# Patient Record
Sex: FEMALE | Race: WHITE | NOT HISPANIC OR LATINO | URBAN - METROPOLITAN AREA
[De-identification: names, ages, dates, MRNs, and addresses within clinical notes are randomized per-mention and may not be internally consistent; named-entity substitution may affect disease eponyms.]

---

## 2017-01-19 ENCOUNTER — COMMUNICATION - HEALTHEAST (OUTPATIENT)
Dept: HEALTH INFORMATION MANAGEMENT | Facility: CLINIC | Age: 32
End: 2017-01-19

## 2018-06-20 ENCOUNTER — HOSPITAL ENCOUNTER (OUTPATIENT)
Dept: OBGYN | Facility: CLINIC | Age: 33
Discharge: HOME | End: 2018-06-20
Attending: OBSTETRICS & GYNECOLOGY | Admitting: OBSTETRICS & GYNECOLOGY

## 2018-06-20 ASSESSMENT — MIFFLIN-ST. JEOR: SCORE: 1639.36

## 2018-06-22 ENCOUNTER — HOSPITAL ENCOUNTER (OUTPATIENT)
Dept: MEDSURG UNIT | Facility: CLINIC | Age: 33
Discharge: HOME | End: 2018-06-22
Attending: OBSTETRICS & GYNECOLOGY | Admitting: OBSTETRICS & GYNECOLOGY

## 2018-06-22 LAB — FETAL RBC % LFV: 0 %

## 2018-06-22 ASSESSMENT — MIFFLIN-ST. JEOR: SCORE: 1639.36

## 2018-08-07 ENCOUNTER — HOSPITAL ENCOUNTER (OUTPATIENT)
Dept: OBGYN | Facility: CLINIC | Age: 33
Discharge: HOME | End: 2018-08-07
Attending: ADVANCED PRACTICE MIDWIFE | Admitting: OBSTETRICS & GYNECOLOGY

## 2018-08-07 LAB
ABO/RH(D): NORMAL
ALT SERPL W P-5'-P-CCNC: 19 U/L (ref 0–45)
ANTIBODY SCREEN: NEGATIVE
APTT PPP: 26 SECONDS (ref 24–37)
AST SERPL W P-5'-P-CCNC: 18 U/L (ref 0–40)
CREAT SERPL-MCNC: 0.58 MG/DL (ref 0.6–1.1)
CREAT UR-MCNC: 35.5 MG/DL
ERYTHROCYTE [DISTWIDTH] IN BLOOD BY AUTOMATED COUNT: 13.7 % (ref 11–14.5)
GFR SERPL CREATININE-BSD FRML MDRD: >60 ML/MIN/1.73M2
HCT VFR BLD AUTO: 39.6 % (ref 35–47)
HGB BLD-MCNC: 13.8 G/DL (ref 12–16)
INR PPP: 1.04 (ref 0.9–1.1)
MCH RBC QN AUTO: 31.9 PG (ref 27–34)
MCHC RBC AUTO-ENTMCNC: 34.8 G/DL (ref 32–36)
MCV RBC AUTO: 92 FL (ref 80–100)
PLATELET # BLD AUTO: 181 THOU/UL (ref 140–440)
PMV BLD AUTO: 9.9 FL (ref 8.5–12.5)
PROTEIN, RANDOM URINE - HISTORICAL: <7 MG/DL
PROTEIN/CREAT RATIO, RANDOM UR: NORMAL
RBC # BLD AUTO: 4.33 MILL/UL (ref 3.8–5.4)
URATE SERPL-MCNC: 4.9 MG/DL (ref 2–7.5)
WBC: 10.1 THOU/UL (ref 4–11)

## 2018-08-07 ASSESSMENT — MIFFLIN-ST. JEOR: SCORE: 1662.04

## 2018-08-08 ENCOUNTER — HOSPITAL ENCOUNTER (OUTPATIENT)
Dept: MEDSURG UNIT | Facility: CLINIC | Age: 33
Discharge: HOME | End: 2018-08-08
Attending: ADVANCED PRACTICE MIDWIFE | Admitting: OBSTETRICS & GYNECOLOGY

## 2018-08-08 LAB
ALBUMIN UR-MCNC: NEGATIVE MG/DL
APPEARANCE UR: CLEAR
BILIRUB UR QL STRIP: NEGATIVE
COLOR UR AUTO: ABNORMAL
CREAT UR-MCNC: 47.8 MG/DL
GLUCOSE BLDC GLUCOMTR-MCNC: 81 MG/DL
GLUCOSE UR STRIP-MCNC: ABNORMAL MG/DL
HGB UR QL STRIP: NEGATIVE
KETONES UR STRIP-MCNC: NEGATIVE MG/DL
LEUKOCYTE ESTERASE UR QL STRIP: NEGATIVE
NITRATE UR QL: NEGATIVE
PH UR STRIP: 7 [PH] (ref 4.5–8)
PROTEIN, RANDOM URINE - HISTORICAL: <7 MG/DL
PROTEIN/CREAT RATIO, RANDOM UR: NORMAL
SP GR UR STRIP: 1.01 (ref 1–1.03)
UROBILINOGEN UR STRIP-ACNC: ABNORMAL

## 2018-08-13 ENCOUNTER — HOSPITAL ENCOUNTER (OUTPATIENT)
Dept: MEDSURG UNIT | Facility: CLINIC | Age: 33
Discharge: HOME | End: 2018-08-13
Attending: OBSTETRICS & GYNECOLOGY | Admitting: OBSTETRICS & GYNECOLOGY

## 2018-08-13 LAB
ABO/RH(D): NORMAL
ALT SERPL W P-5'-P-CCNC: 18 U/L (ref 0–45)
ANTIBODY SCREEN: NEGATIVE
APTT PPP: 26 SECONDS (ref 24–37)
AST SERPL W P-5'-P-CCNC: 19 U/L (ref 0–40)
CREAT SERPL-MCNC: 0.59 MG/DL (ref 0.6–1.1)
CREAT UR-MCNC: 120.2 MG/DL
ERYTHROCYTE [DISTWIDTH] IN BLOOD BY AUTOMATED COUNT: 13.9 % (ref 11–14.5)
GFR SERPL CREATININE-BSD FRML MDRD: >60 ML/MIN/1.73M2
HCT VFR BLD AUTO: 39 % (ref 35–47)
HGB BLD-MCNC: 13.8 G/DL (ref 12–16)
INR PPP: 1.07 (ref 0.9–1.1)
MCH RBC QN AUTO: 32.5 PG (ref 27–34)
MCHC RBC AUTO-ENTMCNC: 35.4 G/DL (ref 32–36)
MCV RBC AUTO: 92 FL (ref 80–100)
PLATELET # BLD AUTO: 166 THOU/UL (ref 140–440)
PMV BLD AUTO: 9.9 FL (ref 8.5–12.5)
PROTEIN, RANDOM URINE - HISTORICAL: 12 MG/DL
PROTEIN/CREAT RATIO, RANDOM UR: 0.1
RBC # BLD AUTO: 4.25 MILL/UL (ref 3.8–5.4)
URATE SERPL-MCNC: 5.1 MG/DL (ref 2–7.5)
WBC: 10.6 THOU/UL (ref 4–11)

## 2018-08-13 ASSESSMENT — MIFFLIN-ST. JEOR: SCORE: 1662.04

## 2018-08-20 ENCOUNTER — RECORDS - HEALTHEAST (OUTPATIENT)
Dept: ADMINISTRATIVE | Facility: OTHER | Age: 33
End: 2018-08-20

## 2018-08-21 ENCOUNTER — ANESTHESIA - HEALTHEAST (OUTPATIENT)
Dept: OBGYN | Facility: CLINIC | Age: 33
End: 2018-08-21

## 2018-08-23 ENCOUNTER — HOME CARE/HOSPICE - HEALTHEAST (OUTPATIENT)
Dept: HOME HEALTH SERVICES | Facility: HOME HEALTH | Age: 33
End: 2018-08-23

## 2018-08-24 ENCOUNTER — HOME CARE/HOSPICE - HEALTHEAST (OUTPATIENT)
Dept: HOME HEALTH SERVICES | Facility: HOME HEALTH | Age: 33
End: 2018-08-24

## 2021-06-01 VITALS — WEIGHT: 201 LBS | BODY MASS INDEX: 31.55 KG/M2 | HEIGHT: 67 IN

## 2021-06-01 VITALS — HEIGHT: 67 IN | BODY MASS INDEX: 31.55 KG/M2 | WEIGHT: 201 LBS

## 2021-06-01 VITALS — BODY MASS INDEX: 32.33 KG/M2 | WEIGHT: 206 LBS | HEIGHT: 67 IN

## 2021-06-01 VITALS — HEIGHT: 67 IN | WEIGHT: 206 LBS | BODY MASS INDEX: 32.33 KG/M2

## 2021-06-16 PROBLEM — O13.3 GESTATIONAL HYPERTENSION, THIRD TRIMESTER: Status: ACTIVE | Noted: 2018-08-20

## 2021-06-18 NOTE — PROGRESS NOTES
Pt into triage after falling 2 days ago.  Pt was monitored at that time and labs were WNL.  Today patient went to her clinic appt with C/O baby not moving and feeling that something was off.  Pt had ultrasound at [facility] that was WNL and sent here to [facility] triage for further evaluation of lab and NST.  Pt currently has a category 1 tracing and her KB was negative.  Dr Lizarraga updated.  Plan to monitor until 1600.

## 2021-06-18 NOTE — PROGRESS NOTES
"OBGYN TRIAGE NOTE    Pt is a  @ 31 weeks who presents today after a fall. She tripped and landed on her belly. She did not land on the floor, she landed on her child's cot. She was then sent here for monitoring. Pt states not having any pain. She is having some BHs. She typically does have some BHs. She is RH pos. Good FM.    OBJECTIVE:  Blood pressure 134/84, pulse 93, temperature 98.3  F (36.8  C), temperature source Oral, resp. rate 18, height 5' 7\" (1.702 m), weight 201 lb (91.2 kg), unknown if currently breastfeeding.    GEN NAD  TOCO very irregular contraction  FHT baseline 130 mod sami +accels - decels CAT I FHT    ASSESSMENT/PLAN:  Pt is here for monitoring after fall. CAT I FHT. Due to the fact she fell flat on her stomach will watch continuously for four hours. Pt agrees to plan.  Rh pos.     Kimberlee Nguyen MD  2018 3:57 PM  [facility]  Pager [phone]  Cell       "

## 2021-06-19 NOTE — PROGRESS NOTES
"Patient presents to OB with complaints of higher blood pressure, lots of diarrhea, and with the diarrhea came nausea. She states she wasn't \"feeling good.\" So she went to [facility] this morning, after waiting for ten minutes they took her blood pressure which was 140/96. Then at home her  is an EMT and he took her Blood pressure a few times.  0940-147/96  1200 -  126/88  1300-    118/85  When she presented to triage her Blood pressure was 118/74 and five minutes later 123/68. Her hypertensive assessment was negative.     In addition patient complains of occasional discharge and vaginal pressure/cramping.    Renée Hayes CNM was updated. No labs drawn since they were just drawn on 8/7/18 and they were WNL. But will do a prt/crt and a urine assessment. Ok to discharge patient if urine comes back WNL.      "

## 2021-06-19 NOTE — PROGRESS NOTES
"Pt arrives to OB unit c/o a sensation of \"head fullness or pressure\" and having seen spots in her vision this evening. Pt reports that while on the couch this evening she saw spots in her vision that lasted less than a minute. She reports an episode similar that lasted a minute or less yesterday as well. She had her  take her blood pressure at home and it was 140's /90's. [facility] and it was 140's/90's as well. Pt denies any upper abd pain. She denies any pain at this time and says her head is not painful, just \"full feeling.\" Reflexes normal and no clonus noted on exam. Bloodwork and urine sent to lab.   "

## 2021-06-20 NOTE — ANESTHESIA PROCEDURE NOTES
Epidural Block    Patient location during procedure: OB  Time Called: 8/21/2018 6:02 PM  Reason for Block:labor epidural  Staffing:  Performing  Anesthesiologist: CHUN MARY  Preanesthetic Checklist  Completed: patient identified, risks, benefits, and alternatives discussed, timeout performed, consent obtained, at patient's request, airway assessed, oxygen available, suction available, emergency drugs available and hand hygiene performed  Procedure  Patient position: sitting  Prep: ChloraPrep and site prepped and draped  Patient monitoring: continuous pulse oximetry, heart rate and blood pressure  Approach: midline  Location: L2-L3  Injection technique: TAM saline  Number of Attempts:1  Needle  Needle type: UNX   Needle gauge: 18 G     Catheter in Space: 5  Assessment  Sensory level: T10  No complications      Additional Notes:  3cc 1% lidocaine skin, negative aspiration, negative test dose of 4cc 1.5% PF lidocaine with epi 1/200.

## 2021-06-20 NOTE — ANESTHESIA POSTPROCEDURE EVALUATION
Patient: Kay Mcgee  * No procedures listed *  Anesthesia type: epidural    Patient location: [facility]  Last vitals:   Vitals:    08/21/18 2224   BP: 125/66   Pulse: 98   Resp:    Temp:    SpO2:      Post vital signs: stable  Level of consciousness: awake and responds to simple questions  Post-anesthesia pain: pain controlled  Post-anesthesia nausea and vomiting: no  Pulmonary: unassisted, return to baseline  Cardiovascular: stable and blood pressure at baseline  Hydration: adequate  Anesthetic events: no    QCDR Measures:  ASA# 11 - Cintia-op Cardiac Arrest: ASA11B - Patient did NOT experience unanticipated cardiac arrest  ASA# 12 - Cintia-op Mortality Rate: ASA12B - Patient did NOT die  ASA# 13 - PACU Re-Intubation Rate: NA - No ETT / LMA used for case  ASA# 10 - Composite Anes Safety: ASA10A - No serious adverse event    Additional Notes:

## 2021-06-20 NOTE — ANESTHESIA PREPROCEDURE EVALUATION
Anesthesia Evaluation      Patient summary reviewed   No history of anesthetic complications     Airway   Mallampati: II  Neck ROM: full   Pulmonary - negative ROS    breath sounds clear to auscultation                         Cardiovascular   Exercise tolerance: > or = 4 METS  (+) hypertension (coags/plt wnl), ,     (-) angina  Rhythm: regular  Rate: normal,         Neuro/Psych - negative ROS     Endo/Other    (+) diabetes mellitus, pregnant     GI/Hepatic/Renal - negative ROS           Dental - normal exam                        Anesthesia Plan  Planned anesthetic: epidural    ASA 2     Anesthetic plan and risks discussed with: patient    Post-op plan: routine recovery

## 2021-06-26 ENCOUNTER — HEALTH MAINTENANCE LETTER (OUTPATIENT)
Age: 36
End: 2021-06-26

## 2021-10-16 ENCOUNTER — HEALTH MAINTENANCE LETTER (OUTPATIENT)
Age: 36
End: 2021-10-16

## 2022-07-23 ENCOUNTER — HEALTH MAINTENANCE LETTER (OUTPATIENT)
Age: 37
End: 2022-07-23

## 2022-10-01 ENCOUNTER — HEALTH MAINTENANCE LETTER (OUTPATIENT)
Age: 37
End: 2022-10-01

## 2023-08-06 ENCOUNTER — HEALTH MAINTENANCE LETTER (OUTPATIENT)
Age: 38
End: 2023-08-06